# Patient Record
Sex: MALE | Race: OTHER | HISPANIC OR LATINO | URBAN - METROPOLITAN AREA
[De-identification: names, ages, dates, MRNs, and addresses within clinical notes are randomized per-mention and may not be internally consistent; named-entity substitution may affect disease eponyms.]

---

## 2018-04-19 ENCOUNTER — EMERGENCY (EMERGENCY)
Facility: HOSPITAL | Age: 48
LOS: 1 days | Discharge: HOME | End: 2018-04-19
Attending: EMERGENCY MEDICINE
Payer: COMMERCIAL

## 2018-04-19 VITALS
WEIGHT: 169.98 LBS | RESPIRATION RATE: 18 BRPM | TEMPERATURE: 98 F | HEIGHT: 69 IN | SYSTOLIC BLOOD PRESSURE: 139 MMHG | OXYGEN SATURATION: 98 % | HEART RATE: 83 BPM | DIASTOLIC BLOOD PRESSURE: 93 MMHG

## 2018-04-19 PROCEDURE — 99283 EMERGENCY DEPT VISIT LOW MDM: CPT

## 2018-04-19 RX ORDER — ACETAMINOPHEN 500 MG
975 TABLET ORAL ONCE
Qty: 0 | Refills: 0 | Status: COMPLETED | OUTPATIENT
Start: 2018-04-19 | End: 2018-04-19

## 2018-04-19 RX ORDER — IBUPROFEN 200 MG
600 TABLET ORAL ONCE
Qty: 0 | Refills: 0 | Status: COMPLETED | OUTPATIENT
Start: 2018-04-19 | End: 2018-04-19

## 2018-04-19 RX ADMIN — Medication 975 MILLIGRAM(S): at 21:37

## 2018-04-19 RX ADMIN — Medication 600 MILLIGRAM(S): at 21:37
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
## 2019-07-21 ENCOUNTER — EMERGENCY (EMERGENCY)
Facility: HOSPITAL | Age: 49
LOS: 1 days | Discharge: HOME | End: 2019-07-21
Attending: EMERGENCY MEDICINE
Payer: MEDICAID

## 2019-07-21 VITALS
HEIGHT: 69 IN | HEART RATE: 93 BPM | SYSTOLIC BLOOD PRESSURE: 132 MMHG | RESPIRATION RATE: 17 BRPM | WEIGHT: 175.93 LBS | DIASTOLIC BLOOD PRESSURE: 89 MMHG | OXYGEN SATURATION: 98 % | TEMPERATURE: 98 F

## 2019-07-21 PROBLEM — I10 ESSENTIAL (PRIMARY) HYPERTENSION: Chronic | Status: ACTIVE | Noted: 2018-04-19

## 2019-07-21 PROCEDURE — 99283 EMERGENCY DEPT VISIT LOW MDM: CPT

## 2019-07-21 NOTE — ED PROVIDER NOTE - NSFOLLOWUPINSTRUCTIONS_ED_ALL_ED_FT
(1) Follow up with your primary care physician as discussed. In addition, we did not find evidence of a life threatening illness on your testing here today, but listed below are the specialists that will be necessary to see as an outpatient to continue the workup.  Please call the numbers listed below or [phone] to set up the necessary appointments.  (2) Immediately seek care at your nearest emergency room if your worsen, persist, or do not resolve   (3) Take Tylenol (up to 1000mg or 1 g)  and/or Motrin (up to 600mg) up to every 6 hours as needed for pain.   (4) Continue to take the prescribed medication as instructed

## 2019-07-21 NOTE — ED PROVIDER NOTE - PHYSICAL EXAMINATION
General: well appearing, interactive, well nourished, NAD  HEENT: pupils equal and reactive, normal oropharynx, normal external ears bilaterally   Cardiac: RRR, no MRG appreciated  Resp: lungs clear to auscultation bilaterally, symmetric chest wall rise  Abd: soft, nontender, nondistended, normoactive bowel sounds  : no CVA tenderness  Neuro: Moving all extremities  Skin:  normal color for race, erythematous tender plaques over ARMS bl, erythematous papule over RUQ abd skin, One macule over L shin, niksolsky sign -, no rashes/lesions appreciated over oropharynx

## 2019-07-21 NOTE — ED PROVIDER NOTE - ATTENDING CONTRIBUTION TO CARE
Patient presenting complaining of pruritic rash.  Began on R arm after cutting down a tree/being in foliage, now spreading to both arms and R leg.  Itching slightly helped by benadryl.  Also on oral and topical steroid from PMD, PMD also started doxycyline without improvement.  No throat closing, no difficulty breathing, no chest pains.    A 14 point review of systems is negative except as in HPI or otherwise documented.    Exam:  General: Patient well appearing, vital signs within normal limits  HEENT: airway patent with moist mucous membranes  Respiratory: lungs clear without respiratory distress  Skin: patient with erythematous plaques to RUE, LUQ, R shin, some vesicles present with negative Nicolsky's sign  Psych: normal mood and affect    Cause of rash unclear, possible contact dermatitis? possible poison ivy? already on topical steroids as well as antihistamines and PO steroids - cause unclear.  Will recommend dermatology follow up.

## 2019-07-21 NOTE — ED PROVIDER NOTE - NS ED ROS FT
CONSTITUTIONAL: No fevers, no chills  Eyes: No vision changes  Cardiovascular: No Chest pain  Respiratory: No SOB  Gastrointestinal: No n/v/d, no abd pain  Genitourinary: no dysuria, no hematuria  SKIN: refer to HPI  NEURO: no headache, no weakness or numbness  PSYCHIATRIC: no known mental health issues.

## 2019-07-21 NOTE — ED PROVIDER NOTE - CLINICAL SUMMARY MEDICAL DECISION MAKING FREE TEXT BOX
50yo M pmhx of HTN pw cc of rash. Nikolsky sign -, no oral involvement, doubt serious life threatening etiology of rash. Already has Benadryl PO and topical steroids and abx already not improving sx, needs derm F/U will d/c w/ derm f/u and return precautions

## 2019-07-21 NOTE — ED PROVIDER NOTE - OBJECTIVE STATEMENT
48yo M pmhx of HTN pw cc of rash    Was working outside in yard cutting a tree and had onset of rash on R arm, 3 days later had onset of rash on L arm, last night had rash onset over abdomen and L shin. No further plant exposure after the first day. Rash is pruritic and painful. PCP gave steroid cream and steroids initially, started doxycycline 4 days ago however because rash is continuing to worsen comnig to the ER today. No mucosal involvement.  No vomiting/abd pain at any time.  Has also taken benadryl which has not helped the sx.   MARYCARMEN
